# Patient Record
Sex: FEMALE | Race: WHITE | NOT HISPANIC OR LATINO | URBAN - NONMETROPOLITAN AREA
[De-identification: names, ages, dates, MRNs, and addresses within clinical notes are randomized per-mention and may not be internally consistent; named-entity substitution may affect disease eponyms.]

---

## 2019-10-03 ENCOUNTER — OFFICE VISIT (OUTPATIENT)
Dept: URGENT CARE | Facility: PHYSICIAN GROUP | Age: 64
End: 2019-10-03
Payer: OTHER MISCELLANEOUS

## 2019-10-03 VITALS
DIASTOLIC BLOOD PRESSURE: 86 MMHG | RESPIRATION RATE: 16 BRPM | TEMPERATURE: 97.9 F | HEART RATE: 80 BPM | BODY MASS INDEX: 34.91 KG/M2 | OXYGEN SATURATION: 96 % | HEIGHT: 63 IN | WEIGHT: 197 LBS | SYSTOLIC BLOOD PRESSURE: 124 MMHG

## 2019-10-03 DIAGNOSIS — K52.9 GASTROENTERITIS: ICD-10-CM

## 2019-10-03 LAB
APPEARANCE UR: CLEAR
BILIRUB UR STRIP-MCNC: NORMAL MG/DL
COLOR UR AUTO: YELLOW
GLUCOSE UR STRIP.AUTO-MCNC: NORMAL MG/DL
KETONES UR STRIP.AUTO-MCNC: NORMAL MG/DL
LEUKOCYTE ESTERASE UR QL STRIP.AUTO: NORMAL
NITRITE UR QL STRIP.AUTO: NORMAL
PH UR STRIP.AUTO: 5 [PH] (ref 5–8)
PROT UR QL STRIP: NORMAL MG/DL
RBC UR QL AUTO: NORMAL
SP GR UR STRIP.AUTO: 1.03
UROBILINOGEN UR STRIP-MCNC: 1 MG/DL

## 2019-10-03 PROCEDURE — 81002 URINALYSIS NONAUTO W/O SCOPE: CPT | Performed by: PHYSICIAN ASSISTANT

## 2019-10-03 PROCEDURE — 99203 OFFICE O/P NEW LOW 30 MIN: CPT | Performed by: PHYSICIAN ASSISTANT

## 2019-10-03 RX ORDER — ONDANSETRON 4 MG/1
4 TABLET, FILM COATED ORAL EVERY 4 HOURS PRN
Qty: 30 TAB | Refills: 0 | Status: SHIPPED | OUTPATIENT
Start: 2019-10-03

## 2019-10-03 ASSESSMENT — ENCOUNTER SYMPTOMS
CHILLS: 0
BLOATING: 0
SPUTUM PRODUCTION: 0
FEVER: 0
BLOOD IN STOOL: 0
HEADACHES: 0
VOMITING: 1
DIARRHEA: 1
SHORTNESS OF BREATH: 0
NAUSEA: 1
COUGH: 0
MUSCULOSKELETAL NEGATIVE: 1
WEIGHT LOSS: 0
SWEATS: 0
ABDOMINAL PAIN: 0
CONSTIPATION: 0
DIZZINESS: 0

## 2019-10-03 NOTE — PROGRESS NOTES
"Subjective:      Dustin Razo is a 64 y.o. female who presents with Diarrhea and Emesis            Diarrhea    This is a new problem. The current episode started in the past 7 days (5 days). The problem occurs 5 to 10 times per day. The problem has been unchanged. The stool consistency is described as watery. Associated symptoms include vomiting. Pertinent negatives include no abdominal pain, bloating, chills, coughing, fever, headaches, sweats or weight loss. Risk factors include ill contacts. She has tried nothing for the symptoms. There is no history of irritable bowel syndrome or a recent abdominal surgery.     Patient presents to urgent care reporting a 5 day history of nausea, vomiting, and watery diarrhea. Her two granddaughters both had similar symptoms before she did. No recent travel, suspect food intake, or recent use of antibiotics. She denies fevers, chills, body aches, constipation, urinary symptoms, or bloody stools. She is UTD on colonoscopy screenings, no known colon disease.     Review of Systems   Constitutional: Negative for chills, fever and weight loss.   HENT: Negative for congestion.    Respiratory: Negative for cough, sputum production and shortness of breath.    Cardiovascular: Negative for chest pain.   Gastrointestinal: Positive for diarrhea, nausea and vomiting. Negative for abdominal pain, bloating, blood in stool and constipation.   Genitourinary: Negative.    Musculoskeletal: Negative.    Skin: Negative for rash.   Neurological: Negative for dizziness and headaches.        Objective:     /86 (BP Location: Left arm, Patient Position: Sitting, BP Cuff Size: Large adult)   Pulse 80   Temp 36.6 °C (97.9 °F) (Temporal)   Resp 16   Ht 1.6 m (5' 3\")   Wt 89.4 kg (197 lb)   SpO2 96%   BMI 34.90 kg/m²      Physical Exam   Constitutional: She is oriented to person, place, and time. She appears well-developed and well-nourished. No distress.   HENT:   Head: Normocephalic and " atraumatic.   Eyes: Pupils are equal, round, and reactive to light.   Neck: Normal range of motion.   Cardiovascular: Normal rate.   Pulmonary/Chest: Effort normal.   Abdominal: Soft. Normal appearance. She exhibits no distension. Bowel sounds are increased. There is no tenderness. There is no rebound, no guarding, no CVA tenderness, no tenderness at McBurney's point and negative Vaughn's sign.   No CVAT bilaterally    Musculoskeletal: Normal range of motion.   Neurological: She is alert and oriented to person, place, and time.   Skin: Skin is warm and dry. She is not diaphoretic.   Psychiatric: She has a normal mood and affect. Her behavior is normal.   Nursing note and vitals reviewed.         PMH:  has no past medical history on file.  MEDS:   Current Outpatient Medications:   •  ondansetron (ZOFRAN) 4 MG Tab tablet, Take 1 Tab by mouth every four hours as needed for Nausea/Vomiting., Disp: 30 Tab, Rfl: 0  ALLERGIES:   Allergies   Allergen Reactions   • Pcn [Penicillins]      SURGHX: History reviewed. No pertinent surgical history.  SOCHX:  reports that she has never smoked. She has never used smokeless tobacco.  FH: family history is not on file.    POCT Urinalysis:  Ref Range & Units 1:44 PM    POC Color Negative Yellow    POC Appearance Negative Clear    POC Leukocyte Esterase Negative Neg    POC Nitrites Negative Neg    POC Urobiligen Negative (0.2) mg/dL 1    POC Protein Negative mg/dL Neg    POC Urine PH 5.0 - 8.0 5    POC Blood Negative MOderate    POC Specific Gravity <1.005 - >1.030 1.030    POC Ketones Negative mg/dL Neg    POC Bilirubin Negative mg/dL Neg    POC Glucose Negative mg/dL Neg          Specimen Collected: 10/03/19  1:44 PM   Last Resulted: 10/03/19  1:45 PM             Assessment/Plan:     1. Gastroenteritis  - POCT Urinalysis --> + blood, otherwise normal   - Patient reports she's always told she has blood in her urine   - ondansetron (ZOFRAN) 4 MG Tab tablet; Take 1 Tab by mouth every  four hours as needed for Nausea/Vomiting.  Dispense: 30 Tab; Refill: 0    Advised patient symptoms are most likely viral in etiology, recommend supportive care. Increased fluids and rest. Zofran as needed for nausea/vomiting. BRAT diet discussed. Encouraged OTC imodium and probiotics. Call or return to office if symptoms persist or worsen. The patient demonstrated a good understanding and agreed with the treatment plan.